# Patient Record
Sex: FEMALE | Race: BLACK OR AFRICAN AMERICAN | URBAN - METROPOLITAN AREA
[De-identification: names, ages, dates, MRNs, and addresses within clinical notes are randomized per-mention and may not be internally consistent; named-entity substitution may affect disease eponyms.]

---

## 2023-01-24 ENCOUNTER — APPOINTMENT (RX ONLY)
Dept: URBAN - METROPOLITAN AREA CLINIC 302 | Facility: CLINIC | Age: 63
Setting detail: DERMATOLOGY
End: 2023-01-24

## 2023-01-24 DIAGNOSIS — L81.4 OTHER MELANIN HYPERPIGMENTATION: ICD-10-CM | Status: INADEQUATELY CONTROLLED

## 2023-01-24 DIAGNOSIS — Z92.25 PERSONAL HISTORY OF IMMUNOSUPPRESSION THERAPY: ICD-10-CM

## 2023-01-24 DIAGNOSIS — Z41.9 ENCOUNTER FOR PROCEDURE FOR PURPOSES OTHER THAN REMEDYING HEALTH STATE, UNSPECIFIED: ICD-10-CM

## 2023-01-24 PROCEDURE — ? PRESCRIPTION

## 2023-01-24 PROCEDURE — 99203 OFFICE O/P NEW LOW 30 MIN: CPT

## 2023-01-24 PROCEDURE — ? COSMETIC CONSULTATION - MICRO-NEEDLING

## 2023-01-24 PROCEDURE — ? COUNSELING

## 2023-01-24 PROCEDURE — ? COSMETIC CONSULTATION: CHEMICAL PEELS

## 2023-01-24 PROCEDURE — ? COSMETIC QUOTE

## 2023-01-24 ASSESSMENT — LOCATION DETAILED DESCRIPTION DERM
LOCATION DETAILED: RIGHT CENTRAL MALAR CHEEK
LOCATION DETAILED: LEFT CENTRAL MALAR CHEEK
LOCATION DETAILED: RIGHT SUPERIOR UPPER BACK

## 2023-01-24 ASSESSMENT — LOCATION SIMPLE DESCRIPTION DERM
LOCATION SIMPLE: LEFT CHEEK
LOCATION SIMPLE: RIGHT CHEEK
LOCATION SIMPLE: RIGHT UPPER BACK

## 2023-01-24 ASSESSMENT — LOCATION ZONE DERM
LOCATION ZONE: TRUNK
LOCATION ZONE: FACE

## 2023-01-24 NOTE — PROCEDURE: COSMETIC QUOTE
Injectable 20 Free Text Discount (In Dollars- Use Only Numbers And Decimals): 0.00
Face Procedure 7 Percentage Discount: 0
Include Sales Tax On Surgeon's Fees: Yes
Face Procedure 1 Percentage Discount: 20
Misc Procedure 1: +ZO Gentle 
Misc Procedure 2: +SBS [place] [employer] (AM/PM)
Face Procedure 2 Price/Unit (In Dollars- Use Only Numbers And Decimals): 429.00
Apply Facility Fee: No
Misc Procedure 2 Units: 1
Misc Procedure 3: +SBS Trio Rebalance
Face Procedure 1: SkinPen Microneedling
Misc Procedure 4 Price/Unit (In Dollars- Use Only Numbers And Decimals): 30.50
Misc Procedure 1 Price/Unit (In Dollars- Use Only Numbers And Decimals): 47.00
Face Procedure 1 Units: 3
Face Procedure 2: VI Peel Precision Plus
Misc Procedure 2 Price/Unit (In Dollars- Use Only Numbers And Decimals): 225.00
Detail Level: Zone
Misc Procedure 3 Price/Unit (In Dollars- Use Only Numbers And Decimals): 140.00
Misc Procedure 4: +EltaMD UV Daily SPF 40
Face Procedure 1 Price/Unit (In Dollars- Use Only Numbers And Decimals): 379.00
Quote Title (Optional- Will Act As A Header): Skin Rejuvenation Plan
Face Procedure 1 Price/Unit (In Dollars- Use Only Numbers And Decimals): 349.00
Quote Title (Optional- Will Act As A Header): 2nd Option

## 2023-01-24 NOTE — PROCEDURE: COUNSELING
Detail Level: Zone
Patient Specific Counseling (Will Not Stick From Patient To Patient): LIKELY THIS IS MELASMA\\nit started in her 30s\\nis located on her cheeks bilaterally\\npt. tried OTC Ambifade containing hydroquinoine 2% for a year but it didn't help\\nshe is not using any spf\\n\\nrec'd Skin[employer]uticals CE Ferulic serum q am\\nElta MD UV clear spf daily\\n\\nand will rx melasma cream for her to use QHS for 3 mos., then off for 3 mos., then may repeat cycle prn\\n\\nwill have her meet with our  to discuss peels and other potential txments
Detail Level: Generalized
Patient Specific Counseling (Will Not Stick From Patient To Patient): pt. is on Enbrel for her RA\\nI let her know this did make her more at risk for NMSC